# Patient Record
Sex: FEMALE | Race: WHITE
[De-identification: names, ages, dates, MRNs, and addresses within clinical notes are randomized per-mention and may not be internally consistent; named-entity substitution may affect disease eponyms.]

---

## 2018-07-10 ENCOUNTER — HOSPITAL ENCOUNTER (OUTPATIENT)
Dept: HOSPITAL 74 - JASU-ENDO | Age: 43
Discharge: HOME | End: 2018-07-10
Attending: INTERNAL MEDICINE
Payer: COMMERCIAL

## 2018-07-10 VITALS — DIASTOLIC BLOOD PRESSURE: 61 MMHG | HEART RATE: 74 BPM | SYSTOLIC BLOOD PRESSURE: 124 MMHG

## 2018-07-10 VITALS — BODY MASS INDEX: 37.5 KG/M2

## 2018-07-10 VITALS — TEMPERATURE: 98 F

## 2018-07-10 DIAGNOSIS — K64.4: ICD-10-CM

## 2018-07-10 DIAGNOSIS — K64.8: ICD-10-CM

## 2018-07-10 DIAGNOSIS — K62.5: Primary | ICD-10-CM

## 2018-07-10 PROCEDURE — 0W3P8ZZ CONTROL BLEEDING IN GASTROINTESTINAL TRACT, VIA NATURAL OR ARTIFICIAL OPENING ENDOSCOPIC: ICD-10-PCS | Performed by: INTERNAL MEDICINE

## 2018-12-08 ENCOUNTER — HOSPITAL ENCOUNTER (EMERGENCY)
Dept: HOSPITAL 74 - JERFT | Age: 43
Discharge: HOME | End: 2018-12-08
Payer: COMMERCIAL

## 2018-12-08 VITALS — HEART RATE: 80 BPM | DIASTOLIC BLOOD PRESSURE: 55 MMHG | TEMPERATURE: 98.4 F | SYSTOLIC BLOOD PRESSURE: 121 MMHG

## 2018-12-08 VITALS — BODY MASS INDEX: 37 KG/M2

## 2018-12-08 DIAGNOSIS — H00.021: Primary | ICD-10-CM

## 2018-12-08 NOTE — PDOC
History of Present Illness





- General


Chief Complaint: Eye Problem


Stated Complaint: EYE PAIN


Time Seen by Provider: 12/08/18 18:02


History Source: Patient





- History of Present Illness


Timing/Duration: other





Past History





- Past Medical History


Allergies/Adverse Reactions: 


 Allergies











Allergy/AdvReac Type Severity Reaction Status Date / Time


 


Penicillins Allergy  Rash Verified 12/08/18 17:43











Home Medications: 


Ambulatory Orders





Bacitracin Ophthalmic Oint - 1 applic TP ASDIR #1 tube 12/08/18 








Anemia: Yes


Asthma: No


Cancer: No


Cardiac Disorders: Yes (MI)


CVA: No


COPD: No


CHF: No


Dementia: No


Diabetes: No


GI Disorders: No


 Disorders: No


HTN: No


Hypercholesterolemia: No


Kidney Stones: Yes


Liver Disease: No


Seizures: No


Thyroid Disease: No





- Surgical History


Abdominal Surgery: No


Appendectomy: No


Cardiac Surgery: No


Cholecystectomy: Yes


Lung Surgery: No


Neurologic Surgery: No


Orthopedic Surgery: No





- Immunization History


Immunization Up to Date: Yes





- Suicide/Smoking/Psychosocial Hx


Smoking Status: No


Smoking History: Never smoked


Have you smoked in the past 12 months: No


Number of Cigarettes Smoked Daily: 0


Hx Alcohol Use: No


Drug/Substance Use Hx: No


Substance Use Type: None


Hx Substance Use Treatment: No





**Review of Systems





- Review of Systems


HEENTM: Yes: Eye Pain.  No: Blurred Vision





*Physical Exam





- Vital Signs


 Last Vital Signs











Temp Pulse Resp BP Pulse Ox


 


 98.4 F   80   18   121/55 L  98 


 


 12/08/18 17:43  12/08/18 17:43  12/08/18 17:43  12/08/18 17:43  12/08/18 17:43














- Physical Exam


General Appearance: Yes: Appropriately Dressed.  No: Apparent Distress


HEENT: positive: Normal Voice, Other (minimal swelling to inner upper lid of R 

eye w/ corresponding ~1-2mm pustule on underside of upper lid, no fb and no 

surrounding erythema )


Neck: positive: Supple


Respiratory/Chest: negative: Respiratory Distress


Integumentary: positive: Dry, Warm


Neurologic: positive: Fully Oriented, Alert, Normal Mood/Affect





Moderate Sedation





- Procedure Monitoring


Vital Signs: 


Procedure Monitoring Vital Signs











Temperature  98.4 F   12/08/18 17:43


 


Pulse Rate  80   12/08/18 17:43


 


Respiratory Rate  18   12/08/18 17:43


 


Blood Pressure  121/55 L  12/08/18 17:43


 


O2 Sat by Pulse Oximetry (%)  98   12/08/18 17:43











Medical Decision Making





- Medical Decision Making





12/08/18 18:28





43-year-old female, denies any past medical history, presents with R eyelid 

pain and swelling. Pt states she works as a  and states while at 

work cleaning a clients bathroom mirrors 2 days ago, she felt something go into 

her right eye and since then has had pain and swelling.  Denies any foreign 

body sensation at this time and no tearing, discharge, photophobia or visual 

changes.  Denies chemicals in eye. Does not wear contact lens





see exam





Hordeolum, appears internal


No e/o cellulitis at this time


No gross fb on lid eversion


No uptake on woods lamp


-dc w/ warm compresses, bacitracin


-Reasons to return d/w pt











*DC/Admit/Observation/Transfer


Diagnosis at time of Disposition: 


Hordeolum


Qualifiers:


 Hordeolum type: internum Laterality: right Eyelid: upper Qualified Code(s): 

H00.021 - Hordeolum internum right upper eyelid








- Discharge Dispostion


Disposition: HOME


Condition at time of disposition: Good





- Prescriptions


Prescriptions: 


Bacitracin Ophthalmic Oint - 1 applic TP ASDIR #1 tube





- Referrals





- Patient Instructions


Printed Discharge Instructions:  DI for Hordeolum


Additional Instructions: 


Tiene shamar inflamacin de vuong prpado, que puede causar dolor e hinchazn. 

Aplique compresas tibias 4 veces al da nasreen 15 minutos cada vez, garrick se 

explica en la liana de emergencias. Ko Olina un trapo hmedo y colquelo en el 

microondas nasreen 30 segundos y luego aplquelo en el marco derecho hasta que 

alcance la temperatura ambiente. Repite esto 4 veces al da.


Por favor aplique bacitracin al sitio garrick se indica.


Si los sntomas empeoran, regrese a la liana de emergencias





- Post Discharge Activity


Forms/Work/School Notes:  Back to Work

## 2022-02-20 ENCOUNTER — HOSPITAL ENCOUNTER (EMERGENCY)
Dept: HOSPITAL 74 - JERFT | Age: 47
Discharge: HOME | End: 2022-02-20
Payer: COMMERCIAL

## 2022-02-20 VITALS — TEMPERATURE: 97.7 F | HEART RATE: 94 BPM | SYSTOLIC BLOOD PRESSURE: 117 MMHG | DIASTOLIC BLOOD PRESSURE: 77 MMHG

## 2022-02-20 VITALS — BODY MASS INDEX: 39 KG/M2

## 2022-02-20 DIAGNOSIS — R11.11: Primary | ICD-10-CM

## 2022-02-20 PROCEDURE — C9803 HOPD COVID-19 SPEC COLLECT: HCPCS

## 2022-02-20 PROCEDURE — U0003 INFECTIOUS AGENT DETECTION BY NUCLEIC ACID (DNA OR RNA); SEVERE ACUTE RESPIRATORY SYNDROME CORONAVIRUS 2 (SARS-COV-2) (CORONAVIRUS DISEASE [COVID-19]), AMPLIFIED PROBE TECHNIQUE, MAKING USE OF HIGH THROUGHPUT TECHNOLOGIES AS DESCRIBED BY CMS-2020-01-R: HCPCS

## 2022-02-20 PROCEDURE — U0005 INFEC AGEN DETEC AMPLI PROBE: HCPCS

## 2022-03-27 ENCOUNTER — HOSPITAL ENCOUNTER (EMERGENCY)
Dept: HOSPITAL 74 - JER | Age: 47
Discharge: HOME | End: 2022-03-27
Payer: COMMERCIAL

## 2022-03-27 VITALS — BODY MASS INDEX: 36.8 KG/M2

## 2022-03-27 VITALS — SYSTOLIC BLOOD PRESSURE: 117 MMHG | TEMPERATURE: 98 F | HEART RATE: 63 BPM | DIASTOLIC BLOOD PRESSURE: 76 MMHG

## 2022-03-27 DIAGNOSIS — M54.2: Primary | ICD-10-CM

## 2022-03-27 DIAGNOSIS — L40.4: ICD-10-CM
